# Patient Record
Sex: MALE | URBAN - METROPOLITAN AREA
[De-identification: names, ages, dates, MRNs, and addresses within clinical notes are randomized per-mention and may not be internally consistent; named-entity substitution may affect disease eponyms.]

---

## 2023-05-31 NOTE — TELEPHONE ENCOUNTER
DIAGNOSIS: (L) ankle pain   APPOINTMENT DATE: 06/19/2023   NOTES STATUS DETAILS   OFFICE NOTE from referring provider N/A    OFFICE NOTE from other specialist Care Everywhere 08/23/2021 [facility]    DISCHARGE SUMMARY from hospital N/A    DISCHARGE REPORT from the ER N/A    OPERATIVE REPORT N/A    EMG report N/A    MEDICATION LIST N/A    MRI N/A    DEXA (osteoporosis/bone health) N/A    CT SCAN N/A    XRAYS (IMAGES & REPORTS) N/A

## 2023-06-19 ENCOUNTER — ANCILLARY PROCEDURE (OUTPATIENT)
Dept: GENERAL RADIOLOGY | Facility: CLINIC | Age: 53
End: 2023-06-19
Attending: PREVENTIVE MEDICINE
Payer: COMMERCIAL

## 2023-06-19 ENCOUNTER — PRE VISIT (OUTPATIENT)
Dept: ORTHOPEDICS | Facility: CLINIC | Age: 53
End: 2023-06-19

## 2023-06-19 ENCOUNTER — OFFICE VISIT (OUTPATIENT)
Dept: ORTHOPEDICS | Facility: CLINIC | Age: 53
End: 2023-06-19
Payer: COMMERCIAL

## 2023-06-19 DIAGNOSIS — M25.572 LEFT ANKLE PAIN: ICD-10-CM

## 2023-06-19 DIAGNOSIS — M25.572 CHRONIC PAIN OF LEFT ANKLE: Primary | ICD-10-CM

## 2023-06-19 DIAGNOSIS — G89.29 CHRONIC PAIN OF LEFT ANKLE: Primary | ICD-10-CM

## 2023-06-19 DIAGNOSIS — M19.072 ARTHRITIS OF LEFT ANKLE: ICD-10-CM

## 2023-06-19 PROCEDURE — 20605 DRAIN/INJ JOINT/BURSA W/O US: CPT | Mod: LT | Performed by: PREVENTIVE MEDICINE

## 2023-06-19 PROCEDURE — 99207 PR DROP WITH A PROCEDURE: CPT | Performed by: PREVENTIVE MEDICINE

## 2023-06-19 PROCEDURE — 73610 X-RAY EXAM OF ANKLE: CPT | Mod: LT | Performed by: RADIOLOGY

## 2023-06-19 RX ORDER — MELOXICAM 15 MG/1
15 TABLET ORAL DAILY
Qty: 30 TABLET | Refills: 1 | Status: SHIPPED | OUTPATIENT
Start: 2023-06-19

## 2023-06-19 RX ORDER — METHYLPREDNISOLONE ACETATE 40 MG/ML
40 INJECTION, SUSPENSION INTRA-ARTICULAR; INTRALESIONAL; INTRAMUSCULAR; SOFT TISSUE
Status: SHIPPED | OUTPATIENT
Start: 2023-06-19

## 2023-06-19 RX ADMIN — METHYLPREDNISOLONE ACETATE 40 MG: 40 INJECTION, SUSPENSION INTRA-ARTICULAR; INTRALESIONAL; INTRAMUSCULAR; SOFT TISSUE at 14:16

## 2023-06-19 ASSESSMENT — PAIN SCALES - GENERAL: PAINLEVEL: MODERATE PAIN (4)

## 2023-06-19 NOTE — NURSING NOTE
Chief Complaint   Patient presents with     Left Ankle - New Patient, Pain       There were no vitals filed for this visit.    There is no height or weight on file to calculate BMI.      TANIA Simmons NREMT

## 2023-06-19 NOTE — LETTER
6/19/2023         RE: Drew Hidalgo  [address] [place] [id number]        Dear Colleague,    Thank you for referring your patient, Drew Hidalgo, to the [facility] [place]. Please see a copy of my visit note below.    HISTORY OF PRESENT ILLNESS  Mr. Bernardino Hidalgo is a pleasant 52 year old year old male who presents to clinic today with the following:  What problem are you here for? Left ankle pain  Chronic  Had club foot surgery at young age  Has always lived with some weakness in ankle and foot   And now more often gets some pain and swelling in ankle and foot regularly    How long have you had this problem? years    Have you had any recent imaging of this problem? Xrays/MRI/CT scans? today    Have you had treatments for this problem in the past?  -Medications? no  -Physical therapy? no  -Injections? no  -Surgery? At birth    How severe is this problem today? 0-10 scale? 4    How severe has this problem been at WORST in the past? 0-10 scale? 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 Birth defect    Does this problem or its symptoms cause difficulty for you falling asleep or staying asleep? yes    Anything else you want us to know about this problem? no          MEDICAL HISTORY  There is no problem list on file for this patient.      No current outpatient medications on file.       Allergies   Allergen Reactions     Ciprofloxacin      Other reaction(s): Unknown/Not Verified     Morphine And Related      Other reaction(s): Unknown/Not Verified       No family history on file.  Social History     Socioeconomic History     Marital status:      Spouse name: None     Number of children: None     Years of education: None     Highest education level: None   Tobacco Use     Smoking status: Never     Smokeless tobacco: Never       Additional medical/Social/Surgical histories reviewed in [facility] and updated as appropriate.     REVIEW OF SYSTEMS (6/19/2023)  10  point ROS of systems including Constitutional, Eyes, Respiratory, Cardiovascular, Gastroenterology, Genitourinary, Integumentary, Musculoskeletal, Psychiatric, Allergic/Immunologic were all negative except for pertinent positives noted in my HPI.     PHYSICAL EXAM  VSS      General  - normal appearance, in no obvious distress  HEENT  - conjunctivae not injected, moist mucous membranes, normocephalic/atraumatic head, ears normal appearance, no lesions, mouth normal appearance, no scars, normal dentition and teeth present  CV  - normal pulses at posterior tib and dorsalis pedis  Pulm  - normal respiratory pattern, non-labored  Musculoskeletal -left  ankle  - stance: gait does not favors affected side, not reluctant to bear weight  - inspection: moderate swelling laterally, normal bone and joint alignment, no obvious deformity  - palpation: tenderness over ATFL and anterior ankle joint , and posterior tibial tendons, no tenderness over lateral or medial malleoli, navicular, or base of 5th MT  - ROM: intact globally but limited secondary to pain  - strength: 4/5 in eversion, 5/5 in all other planes  - special tests:  Some pain with inversion stress  (-) anterior drawer  (-) talar tilt  (-) Tinel's  (-) squeeze test  (-) Maurer test  Neuro  - no sensory or motor deficit, grossly normal coordination, normal muscle tone  Skin  - no ecchymosis overlying lateral foot-ankle junction, no warmth or induration, no obvious rash  Psych  - interactive, appropriate, normal mood and affect      ASSESSMENT & PLAN  51 yo male with chronic left ankle pain, arthritis, history of surgery for clubfoot  Chronic pain    I independently reviewed the following imaging studies:  xrays ankle shows arthritis  After a 20 minute discussion and examination, we decided to perform a same day injection for diagnostic and therapeutic purposes for  Left ankle joint  suprefeet recommended  Referred to Dr Bustos for recommendations on foot wear/arch  support  Given HEP  F/u 1-2 months  Patient has been doing home exercise physical therapy program for this problem      Appropriate PPE was utilized for prevention of spread of Covid-19.  Robert Flanagan MD, [facility]    PROCEDURE: left ankle joint injection     The patient was apprised of the risks and the benefits of the procedure and consented and a written consent was signed by the patient.   The patient s left ankle was sterilely prepped with chloraprep.   40 mg of depo suspension was drawn up into a 5 mL syringe with 2 mL of 1% lidocaine   The patient was injected with a 1.5-inch 22-gauge needle at anterior ankle joint  There were no complications. The patient tolerated the procedure well. There was minimal bleeding.   The patient was instructed to ice the ankle upon leaving clinic and refrain from overuse over the next 3 days.   The patient was instructed to go to the emergency room with any usual pain, swelling, or redness occurred in the injected area.   The patient was given a followup appointment to evaluate response to the injection to their increased range of motion and reduction of pain.    followup PRN  Dr Robert Flanagan  Medium Joint Injection/Arthrocentesis: L ankle    Date/Time: 6/19/2023 2:16 PM    Performed by: Robert Flanagan MD  Authorized by: Robert Flanagan MD    Indications:  Joint swelling, diagnostic evaluation and pain  Needle Size:  22 G  Guidance: surface landmarks    Approach:  Anterior  Location:  Ankle  Site:  L ankle  Medications:  40 mg methylPREDNISolone 40 MG/ML  Outcome:  Tolerated well, no immediate complications  Procedure discussed: discussed risks, benefits, and alternatives    Consent Given by:  Patient  Timeout: timeout called immediately prior to procedure    Prep: patient was prepped and draped in usual sterile fashion                Again, thank you for allowing me to participate in the care of your patient.        Sincerely,        Robert Flanagan MD

## 2023-06-19 NOTE — PROGRESS NOTES
HISTORY OF PRESENT ILLNESS  Mr. Bernardino Hidalgo is a pleasant 52 year old year old male who presents to clinic today with the following:  What problem are you here for? Left ankle pain  Chronic  Had club foot surgery at young age  Has always lived with some weakness in ankle and foot   And now more often gets some pain and swelling in ankle and foot regularly    How long have you had this problem? years    Have you had any recent imaging of this problem? Xrays/MRI/CT scans? today    Have you had treatments for this problem in the past?  -Medications? no  -Physical therapy? no  -Injections? no  -Surgery? At birth    How severe is this problem today? 0-10 scale? 4    How severe has this problem been at WORST in the past? 0-10 scale? 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 Birth defect    Does this problem or its symptoms cause difficulty for you falling asleep or staying asleep? yes    Anything else you want us to know about this problem? no          MEDICAL HISTORY  There is no problem list on file for this patient.      No current outpatient medications on file.       Allergies   Allergen Reactions     Ciprofloxacin      Other reaction(s): Unknown/Not Verified     Morphine And Related      Other reaction(s): Unknown/Not Verified       No family history on file.  Social History     Socioeconomic History     Marital status:      Spouse name: None     Number of children: None     Years of education: None     Highest education level: None   Tobacco Use     Smoking status: Never     Smokeless tobacco: Never       Additional medical/Social/Surgical histories reviewed in [facility] and updated as appropriate.     REVIEW OF SYSTEMS (6/19/2023)  10 point ROS of systems including Constitutional, Eyes, Respiratory, Cardiovascular, Gastroenterology, Genitourinary, Integumentary, Musculoskeletal, Psychiatric, Allergic/Immunologic were all negative except for pertinent positives noted in my HPI.     PHYSICAL  EXAM  VSS      General  - normal appearance, in no obvious distress  HEENT  - conjunctivae not injected, moist mucous membranes, normocephalic/atraumatic head, ears normal appearance, no lesions, mouth normal appearance, no scars, normal dentition and teeth present  CV  - normal pulses at posterior tib and dorsalis pedis  Pulm  - normal respiratory pattern, non-labored  Musculoskeletal -left  ankle  - stance: gait does not favors affected side, not reluctant to bear weight  - inspection: moderate swelling laterally, normal bone and joint alignment, no obvious deformity  - palpation: tenderness over ATFL and anterior ankle joint , and posterior tibial tendons, no tenderness over lateral or medial malleoli, navicular, or base of 5th MT  - ROM: intact globally but limited secondary to pain  - strength: 4/5 in eversion, 5/5 in all other planes  - special tests:  Some pain with inversion stress  (-) anterior drawer  (-) talar tilt  (-) Tinel's  (-) squeeze test  (-) Maurer test  Neuro  - no sensory or motor deficit, grossly normal coordination, normal muscle tone  Skin  - no ecchymosis overlying lateral foot-ankle junction, no warmth or induration, no obvious rash  Psych  - interactive, appropriate, normal mood and affect      ASSESSMENT & PLAN  53 yo male with chronic left ankle pain, arthritis, history of surgery for clubfoot  Chronic pain    I independently reviewed the following imaging studies:  xrays ankle shows arthritis  After a 20 minute discussion and examination, we decided to perform a same day injection for diagnostic and therapeutic purposes for  Left ankle joint  suprefeet recommended  Referred to Dr Bustos for recommendations on foot wear/arch support  Given HEP  F/u 1-2 months  Patient has been doing home exercise physical therapy program for this problem      Appropriate PPE was utilized for prevention of spread of Covid-19.  Robert Flanagan MD, [facility]    PROCEDURE: left ankle joint injection     The  patient was apprised of the risks and the benefits of the procedure and consented and a written consent was signed by the patient.   The patient s left ankle was sterilely prepped with chloraprep.   40 mg of depo suspension was drawn up into a 5 mL syringe with 2 mL of 1% lidocaine   The patient was injected with a 1.5-inch 22-gauge needle at anterior ankle joint  There were no complications. The patient tolerated the procedure well. There was minimal bleeding.   The patient was instructed to ice the ankle upon leaving clinic and refrain from overuse over the next 3 days.   The patient was instructed to go to the emergency room with any usual pain, swelling, or redness occurred in the injected area.   The patient was given a followup appointment to evaluate response to the injection to their increased range of motion and reduction of pain.    followup PRN  Dr Robert Flanagan  Medium Joint Injection/Arthrocentesis: L ankle    Date/Time: 6/19/2023 2:16 PM    Performed by: Robert Flanagan MD  Authorized by: Robert Flanagan MD    Indications:  Joint swelling, diagnostic evaluation and pain  Needle Size:  22 G  Guidance: surface landmarks    Approach:  Anterior  Location:  Ankle  Site:  L ankle  Medications:  40 mg methylPREDNISolone 40 MG/ML  Outcome:  Tolerated well, no immediate complications  Procedure discussed: discussed risks, benefits, and alternatives    Consent Given by:  Patient  Timeout: timeout called immediately prior to procedure    Prep: patient was prepped and draped in usual sterile fashion

## 2023-06-19 NOTE — NURSING NOTE
[facility]   ORTHOPEDICS & SPORTS MEDICINE  [address] [place] [address]  Dept: [phone]  ______________________________________________________________________________    Patient: Drew Hidalgo   : 1970   MRN: [id number]   2023    INVASIVE PROCEDURE SAFETY CHECKLIST    Date: 2023   Procedure: Left ankle injection  Patient Name: Drew Hidalgo  MRN: [id number]  YOB: 1970    Action: Complete sections as appropriate. Any discrepancy results in a HARD COPY until resolved.     PRE PROCEDURE:  Patient ID verified with 2 identifiers (name and  or MRN): Yes  Procedure and site verified with patient/designee (when able): Yes  Accurate consent documentation in medical record: Yes  H&P (or appropriate assessment) documented in medical record: Yes  H&P must be up to 20 days prior to procedure and updates within 24 hours of procedure as applicable: NA  Relevant diagnostic and radiology test results appropriately labeled and displayed as applicable: NA  Procedure site(s) marked with provider initials: NA    TIMEOUT:  Time-Out performed immediately prior to starting procedure, including verbal and active participation of all team members addressing the following:Yes  * Correct patient identify  * Confirmed that the correct side and site are marked  * An accurate procedure consent form  * Agreement on the procedure to be done  * Correct patient position  * Relevant images and results are properly labeled and appropriately displayed  * The need to administer antibiotics or fluids for irrigation purposes during the procedure as applicable   * Safety precautions based on patient history or medication use    DURING PROCEDURE: Verification of correct person, site, and procedures any time the responsibility for care of the patient is transferred to another member of the care team.       Prior to injection, verified patient identity using patient's name and date  of birth.  Due to injection administration, patient instructed to remain in clinic for 15 minutes  afterwards, and to report any adverse reaction to me immediately.    Joint injection was performed.      Drug Amount Wasted:  None.  Vial/Syringe: Single dose vial  Expiration Date:  10/31/2024      Troy Santamaria, EMT  June 19, 2023

## 2023-07-19 ENCOUNTER — OFFICE VISIT (OUTPATIENT)
Dept: PODIATRY | Facility: CLINIC | Age: 53
End: 2023-07-19
Payer: COMMERCIAL

## 2023-07-19 DIAGNOSIS — M25.572 CHRONIC PAIN OF LEFT ANKLE: ICD-10-CM

## 2023-07-19 DIAGNOSIS — G89.29 CHRONIC PAIN OF LEFT ANKLE: ICD-10-CM

## 2023-07-19 DIAGNOSIS — M19.072 PRIMARY OSTEOARTHRITIS OF LEFT ANKLE: Primary | ICD-10-CM

## 2023-07-19 PROCEDURE — 99204 OFFICE O/P NEW MOD 45 MIN: CPT | Performed by: PODIATRIST

## 2023-07-19 ASSESSMENT — PAIN SCALES - GENERAL: PAINLEVEL: MILD PAIN (2)

## 2023-07-19 NOTE — PROGRESS NOTES
No past medical history on file.  There is no problem list on file for this patient.    No past surgical history on file.  Social History     Socioeconomic History     Marital status:      Spouse name: Not on file     Number of children: Not on file     Years of education: Not on file     Highest education level: Not on file   Occupational History     Not on file   Tobacco Use     Smoking status: Never     Smokeless tobacco: Never   Substance and Sexual Activity     Alcohol use: Not on file     Drug use: Not on file     Sexual activity: Not on file   Other Topics Concern     Not on file   Social History Narrative     Not on file     Social Determinants of Health     Financial Resource Strain: Not on file   Food Insecurity: Not on file   Transportation Needs: Not on file   Physical Activity: Not on file   Stress: Not on file   Social Connections: Not on file   Intimate Partner Violence: Not on file   Housing Stability: Not on file     No family history on file.        Subjective findings- 52-year-old presents from Dr. Robert Flanagan MD for left ankle Osteoarthritis and pain, I reviewed Dr. Flanagan 6/19/2023 note.  Patient relates he  he had clubfoot as a kid and had surgery on his left ankle.  Relates about 5 years ago he started getting pain in the ankle and that has gradually worsened over time.  He relates the pain has become more consistent, worse if he is on his feet more, the ankle swells and it is worse with activity.  Relates he had an injection by Dr. Flanagan and that helped a lot but is starting to wear off.  Relates he got some orthotics from [facility] and those have helped as well.    Objective findings- DP and PT are 2 out of 4 left, has decreased range of motion of the left foot and ankle, there is no erythema, no gross edema, no odor, no calor, no gross tendon voids, pain on palpation of the left ankle.  X-rays of the left ankle were reviewed with patient in clinic today with joint space  narrowing, subchondral sclerosis and irregularity of the talonavicular and ankle joint.  Reviewed the 8/24/2021 foot and ankle x-ray results as noted in the EMR.    Assessment and plan- Osteoarthritis left ankle, left ankle pain, this is chronic.  Diagnosis and treatment options discussed with the patient.  Prescription for Voltaren gel given and use discussed with the patient.  Prescription for Suhas brace for the left and custom foot orthotics given and use discussed with the patient and he is given the phone number address orthotics and prosthetics lab for this.  Contacted orthotics and prosthetics today and they seen him today in clinic as he is coming from a long distance.  Referral to Dr. Olson in orthopedics for any surgical options given use discussed with the patient.  Return to clinic and see me as needed.  Previous notes reviewed.                  Moderate level of medical decision making.

## 2023-07-19 NOTE — LETTER
7/19/2023         RE: Drew Hidalgo  [address] [facility] [address]        Dear Colleague,    Thank you for referring your patient, Drew Hidalgo, to the [facility]. Please see a copy of my visit note below.    No past medical history on file.  There is no problem list on file for this patient.    No past surgical history on file.  Social History     Socioeconomic History     Marital status:      Spouse name: Not on file     Number of children: Not on file     Years of education: Not on file     Highest education level: Not on file   Occupational History     Not on file   Tobacco Use     Smoking status: Never     Smokeless tobacco: Never   Substance and Sexual Activity     Alcohol use: Not on file     Drug use: Not on file     Sexual activity: Not on file   Other Topics Concern     Not on file   Social History Narrative     Not on file     Social Determinants of Health     Financial Resource Strain: Not on file   Food Insecurity: Not on file   Transportation Needs: Not on file   Physical Activity: Not on file   Stress: Not on file   Social Connections: Not on file   Intimate Partner Violence: Not on file   Housing Stability: Not on file     No family history on file.        Subjective findings- 52-year-old presents from Dr. Robert Flanagan MD for left ankle Osteoarthritis and pain, I reviewed Dr. Flanagan 6/19/2023 note.  Patient relates he  he had clubfoot as a kid and had surgery on his left ankle.  Relates about 5 years ago he started getting pain in the ankle and that has gradually worsened over time.  He relates the pain has become more consistent, worse if he is on his feet more, the ankle swells and it is worse with activity.  Relates he had an injection by Dr. Flanagan and that helped a lot but is starting to wear off.  Relates he got some orthotics from [facility] [employer] and those have helped as well.    Objective findings- DP and PT are 2 out of 4 left, has  decreased range of motion of the left foot and ankle, there is no erythema, no gross edema, no odor, no calor, no gross tendon voids, pain on palpation of the left ankle.  X-rays of the left ankle were reviewed with patient in clinic today with joint space narrowing, subchondral sclerosis and irregularity of the talonavicular and ankle joint.  Reviewed the 8/24/2021 foot and ankle x-ray results as noted in the EMR.    Assessment and plan- Osteoarthritis left ankle, left ankle pain, this is chronic.  Diagnosis and treatment options discussed with the patient.  Prescription for Voltaren gel given and use discussed with the patient.  Prescription for Suhas brace for the left and custom foot orthotics given and use discussed with the patient and he is given the phone number address orthotics and prosthetics lab for this.  Contacted orthotics and prosthetics today and they seen him today in clinic as he is coming from a long distance.  Referral to Dr. Olson in orthopedics for any surgical options given use discussed with the patient.  Return to clinic and see me as needed.  Previous notes reviewed.                  Moderate level of medical decision making.        Again, thank you for allowing me to participate in the care of your patient.        Sincerely,        Raudel Sahu DPM

## 2023-07-19 NOTE — TELEPHONE ENCOUNTER
Action July 19, 2023 3:19 PM MT   Action Taken Sent a request for imaging from [facility].     DIAGNOSIS: LT Ankle   APPOINTMENT DATE: 07/25/2023   NOTES STATUS DETAILS   OFFICE NOTE from referring provider Internal 07/19/2023 - Raudel Sahu DPM- [facility] Podiatry   OFFICE NOTE from other specialist Internal  Care Everywhere 06/19/2023 - Robert Flanagan [facility] Sports Med    08/23/2021 - Dion Young DPM = [facility] Podiatry   OPERATIVE REPORT N/A Clubfoot surgery at a young age.   MEDICATION LIST Internal    XRAYS (IMAGES & REPORTS) PACS Internal    [facility]:  04/10/2023 - Knee Standing  04/10/2023 - LT Knee  08/23/2021 - LT Ankle  08/23/2021- LT Foot

## 2023-07-19 NOTE — NURSING NOTE
Drew Hidalgo's chief complaint for this visit includes:  Chief Complaint   Patient presents with     Consult     L ankle pain     PCP: No Ref-Primary, Physician    Referring Provider:  No referring provider defined for this encounter.    There were no vitals taken for this visit.  Mild Pain (2)        Allergies   Allergen Reactions     Ciprofloxacin      Other reaction(s): Unknown/Not Verified     Morphine And Related      Other reaction(s): Unknown/Not Verified         Do you need any medication refills at today's visit?

## 2023-07-25 ENCOUNTER — OFFICE VISIT (OUTPATIENT)
Dept: ORTHOPEDICS | Facility: CLINIC | Age: 53
End: 2023-07-25
Payer: COMMERCIAL

## 2023-07-25 ENCOUNTER — ANCILLARY PROCEDURE (OUTPATIENT)
Dept: CT IMAGING | Facility: CLINIC | Age: 53
End: 2023-07-25
Attending: ORTHOPAEDIC SURGERY
Payer: COMMERCIAL

## 2023-07-25 ENCOUNTER — PRE VISIT (OUTPATIENT)
Dept: ORTHOPEDICS | Facility: CLINIC | Age: 53
End: 2023-07-25

## 2023-07-25 VITALS — BODY MASS INDEX: 29.82 KG/M2 | WEIGHT: 179 LBS | HEIGHT: 65 IN

## 2023-07-25 DIAGNOSIS — M25.572 PAIN IN JOINT, ANKLE AND FOOT, LEFT: Primary | ICD-10-CM

## 2023-07-25 PROCEDURE — 73700 CT LOWER EXTREMITY W/O DYE: CPT | Mod: LT | Performed by: RADIOLOGY

## 2023-07-25 PROCEDURE — 99204 OFFICE O/P NEW MOD 45 MIN: CPT | Performed by: ORTHOPAEDIC SURGERY

## 2023-07-25 NOTE — NURSING NOTE
"Reason For Visit:   Chief Complaint   Patient presents with    RECHECK     Left Ankle hurt and Pain        Ht 1.651 m (5' 5\")   Wt 81.2 kg (179 lb)   BMI 29.79 kg/m      Pain Assessment  Patient Currently in Pain: Yes  0-10 Pain Scale: 4  Primary Pain Location: Ankle  Other Pain Locations: Left Ankle    Salty Bradley    "

## 2023-07-25 NOTE — LETTER
7/25/2023         RE: Drew Hidalgo  [address] [place] [id number]        Dear Colleague,    Thank you for referring your patient, Drew Hidalgo, to the [facility] [place]. Please see a copy of my visit note below.    Chief complaint: Left ankle pain    Patient is a 52-year-old male who presents the company of female friend for evaluation of his left ankle.  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    Eventually he has been evaluated by our none surgical doctors and an injection of the left ankle was performed in clinic.  He reports to have had some relief from the injection however was not lasting enough.  From that evaluation he was evaluated by one of our nonsurgical podiatrist and eventually he is presenting to us.    Personal have enough pain in the ankle and he would like to have something addressed.  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  He will report that the pain is located mainly along the most anterior and lateral aspect of the ankle joint.    Patient also expresses a concern about his ability to heal as he reports to have long-lasting marks of bruises through his lower legs.    We reviewed today his past medical and surgical history current medications and drug allergies    On today's visit he presents with a weight of 179 pounds with a BMI 29.7    On today's exam he presents with very limited ankle motion with no more than 20 degrees of plantar and dorsiflexion.  Presents with also limited inversion and eversion.  There is a relatively poor soft tissue envelope specially laterally.  Medial anterior aspect of the ankle it is very much acceptable.  There is palpable pulses.  Forefoot exam is grossly unremarkable.    Plan x-rays of the ankle were reviewed today from a previous visit which are significant for showing a fairly dysplastic talus  with quite a bit of arthritis across the ankle talonavicular and subtalar joints.    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  I am hoping that he presents with not enough arthritis that we will have to proceed with a sucker fine joint surgery and he would benefit just from undergoing a debridement.    Think that the injection performed by Dr. Flanagan is highly suggestive that the ankle may be the main source of his pain however he may still proceed with some other diagnostic injections to confirm that in fact the ankle is the only source of discomfort.    In the meantime he has no activity restrictions.    He will be contacted via phone with regards to the CT scan results and future steps to take.    TT 30 minutes          Lucas Garcia MD

## 2023-07-25 NOTE — PROGRESS NOTES
Chief complaint: Left ankle pain    Patient is a 52-year-old male who presents the company of female friend for evaluation of his left ankle.  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    Eventually he has been evaluated by our none surgical doctors and an injection of the left ankle was performed in clinic.  He reports to have had some relief from the injection however was not lasting enough.  From that evaluation he was evaluated by one of our nonsurgical podiatrist and eventually he is presenting to us.    Personal have enough pain in the ankle and he would like to have something addressed.  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  He will report that the pain is located mainly along the most anterior and lateral aspect of the ankle joint.    Patient also expresses a concern about his ability to heal as he reports to have long-lasting marks of bruises through his lower legs.    We reviewed today his past medical and surgical history current medications and drug allergies    On today's visit he presents with a weight of 179 pounds with a BMI 29.7    On today's exam he presents with very limited ankle motion with no more than 20 degrees of plantar and dorsiflexion.  Presents with also limited inversion and eversion.  There is a relatively poor soft tissue envelope specially laterally.  Medial anterior aspect of the ankle it is very much acceptable.  There is palpable pulses.  Forefoot exam is grossly unremarkable.    Plan x-rays of the ankle were reviewed today from a previous visit which are significant for showing a fairly dysplastic talus with quite a bit of arthritis across the ankle talonavicular and subtalar joints.    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  I am hoping that he presents with not enough arthritis that we will have to proceed with a sucker fine joint surgery and he would benefit just from undergoing a debridement.    Think that the injection performed by Dr. Flanagan is highly suggestive that the ankle may be the main source of his pain however he may still proceed with some other diagnostic injections to confirm that in fact the ankle is the only source of discomfort.    In the meantime he has no activity restrictions.    He will be contacted via phone with regards to the CT scan results and future steps to take.    TT 30 minutes

## 2023-08-01 ENCOUNTER — VIRTUAL VISIT (OUTPATIENT)
Dept: ORTHOPEDICS | Facility: CLINIC | Age: 53
End: 2023-08-01
Payer: COMMERCIAL

## 2023-08-01 DIAGNOSIS — M25.572 PAIN IN JOINT, ANKLE AND FOOT, LEFT: Primary | ICD-10-CM

## 2023-08-01 PROCEDURE — 99213 OFFICE O/P EST LOW 20 MIN: CPT | Mod: 93 | Performed by: ORTHOPAEDIC SURGERY

## 2023-08-01 NOTE — PROGRESS NOTES
Chief complaint: Left ankle: Talonavicular, and subtalar joint arthritis secondary to clubfoot deformity    Patient was reach out by phone.  Patient authorized encounter.    Discussed with him the CT scan findings which are significant for showing a fragment of arthritis across the talonavicular joint and to a lesser degree across the navicular cuneiform joint and even to a lesser degree across the ankle and the subtalar joints.    Discussed with him that the chances to find the source of pain therefore cannot proceed with both diagnostic and therapeutic injections across the talonavicular joint and if this is not enough improvement or his symptoms we will try the navicular cuneiform joint and if it is not enough we will proceed with an ankle joint injection.  The injections will be performed with Marcaine and Kenalog under fluoroscopic control.    Patient's duties are to a understand the response to the injections as he will have 1 joint injected then go for a walk and proceed with the second injection go for another walk and so forth.    In the meantime he has no restrictions.  All questions were answered.  We will have another phone encounter once the injections have been performed in order to have a discussion with his response to the injections.    Questions were answered.    I spent 17 minutes with the patient on the phone on today's visit.

## 2023-09-16 ENCOUNTER — HEALTH MAINTENANCE LETTER (OUTPATIENT)
Age: 53
End: 2023-09-16

## 2024-11-09 ENCOUNTER — HEALTH MAINTENANCE LETTER (OUTPATIENT)
Age: 54
End: 2024-11-09